# Patient Record
(demographics unavailable — no encounter records)

---

## 2024-10-15 NOTE — HISTORY OF PRESENT ILLNESS
[FreeTextEntry1] : This is a 68 y/o female with a pmhx of CAD, HTN, HLD, hypothyroidism, DM presents today for follow up.  Patient had holter 9/2024 showing sinus rhythm with runs of SVT, 3 beats of Vtach. Patient was started on toprol 25 mg po qd. She is tolerating the medication well.  Patient continues to report intermittent dizziness, usually when standing up or when moving head.  Patient denies chest pain, dyspnea, palpitations, dizziness, changes in bowel/bladder habits or appetite.

## 2024-12-12 NOTE — HISTORY OF PRESENT ILLNESS
[FreeTextEntry1] : follow-up, CAD [de-identified] : 67 year old female presents for a f/u visit. Currently she has no acute medical complaints. She denies any chest pain, shortness of breath or cough.

## 2024-12-12 NOTE — PLAN
[FreeTextEntry1] : CAD - s/p PCI, c/w current management, f/u w/ cardiology HTN - controlled, c/w same DM - controlled, check labs, c/w invokana, janumet and repaglinide  HLD - c/w lipitor 40, check labs Hypothyroidism - c/w synthroid, check labs  HMT - UTD w/ mammogram and UTD w/ pap smear, pt is hesitant on colon cancer screening - will consider in future.

## 2024-12-12 NOTE — HISTORY OF PRESENT ILLNESS
[FreeTextEntry1] : follow-up, CAD [de-identified] : 67 year old female presents for a f/u visit. Currently she has no acute medical complaints. She denies any chest pain, shortness of breath or cough.

## 2025-03-15 NOTE — PLAN
[FreeTextEntry1] : CAD - s/p PCI, c/w current management, f/u w/ cardiology HTN - controlled, c/w same DM - controlled, check labs, c/w invokana, janumet and repaglinide  HLD - c/w lipitor 40, check labs Hypothyroidism - c/w synthroid, check labs  Malaria prophylaxis - pt is traveling to [facility], will start mefloquine 2 weeks prior to departure HMT - UTD w/ mammogram and UTD w/ pap smear, pt is hesitant on colon cancer screening - will consider in future.

## 2025-03-15 NOTE — HISTORY OF PRESENT ILLNESS
[FreeTextEntry1] : follow-up, hypothyroidism [de-identified] : 67 year old female presents for a f/u visit. Currently she has no acute medical complaints. She denies any chest pain, palpitations or shortness of breath.

## 2025-03-15 NOTE — PLAN
[FreeTextEntry1] : CAD - s/p PCI, c/w current management, f/u w/ cardiology HTN - controlled, c/w same DM - controlled, check labs, c/w invokana, janumet and repaglinide  HLD - c/w lipitor 40, check labs Hypothyroidism - c/w synthroid, check labs  Malaria prophylaxis - pt is traveling to [facility], will start mefloquine 2 weeks prior to departure HMT - UTD w/ mammogram and UTD w/ pap smear, pt is hesitant on colon cancer screening - will consider in future.

## 2025-03-15 NOTE — HISTORY OF PRESENT ILLNESS
[FreeTextEntry1] : follow-up, hypothyroidism [de-identified] : 67 year old female presents for a f/u visit. Currently she has no acute medical complaints. She denies any chest pain, palpitations or shortness of breath.

## 2025-04-18 NOTE — HISTORY OF PRESENT ILLNESS
[FreeTextEntry1] : This is a 66 y/o female with a pmhx of CAD, HTN, HLD, hypothyroidism, DM presents today for follow up and echo. Patient was seen by jesica, Dr. Steen for elevated WBC and low iron. Iron 88 (prev 34), TIBC 388, ferritin 17 (previous 9), Hgb14, HCT 43.8. As per heme, protein electrophoresis and serum immunofixation normal Iron studies improved, Patient to continue po iron 1-2x weekly, for EGD/colonoscopy.  Patient reports intermittent sharp chest pain with no specific pattern with no specific pattern. She also has right shoulder pain due to arthritis. Patient with JOSEPH. Patient with occasional dizziness.  Patient denies palpitations, syncope, changes in bowel/bladder habits or appetite.